# Patient Record
Sex: MALE | Race: BLACK OR AFRICAN AMERICAN | NOT HISPANIC OR LATINO
[De-identification: names, ages, dates, MRNs, and addresses within clinical notes are randomized per-mention and may not be internally consistent; named-entity substitution may affect disease eponyms.]

---

## 2018-09-14 PROBLEM — Z00.00 ENCOUNTER FOR PREVENTIVE HEALTH EXAMINATION: Status: ACTIVE | Noted: 2018-09-14

## 2019-12-10 ENCOUNTER — APPOINTMENT (OUTPATIENT)
Dept: ORTHOPEDIC SURGERY | Facility: CLINIC | Age: 39
End: 2019-12-10

## 2019-12-12 ENCOUNTER — APPOINTMENT (OUTPATIENT)
Dept: ORTHOPEDIC SURGERY | Facility: CLINIC | Age: 39
End: 2019-12-12
Payer: COMMERCIAL

## 2019-12-12 VITALS
BODY MASS INDEX: 37.94 KG/M2 | DIASTOLIC BLOOD PRESSURE: 86 MMHG | HEIGHT: 70 IN | SYSTOLIC BLOOD PRESSURE: 142 MMHG | HEART RATE: 76 BPM | WEIGHT: 265 LBS

## 2019-12-12 PROCEDURE — 99204 OFFICE O/P NEW MOD 45 MIN: CPT

## 2019-12-12 PROCEDURE — 72100 X-RAY EXAM L-S SPINE 2/3 VWS: CPT

## 2019-12-12 RX ORDER — CYCLOBENZAPRINE HYDROCHLORIDE 5 MG/1
5 TABLET, FILM COATED ORAL
Qty: 28 | Refills: 0 | Status: ACTIVE | COMMUNITY
Start: 2019-12-12 | End: 1900-01-01

## 2019-12-12 RX ORDER — MELOXICAM 7.5 MG/1
7.5 TABLET ORAL
Qty: 14 | Refills: 0 | Status: ACTIVE | COMMUNITY
Start: 2019-12-12 | End: 1900-01-01

## 2019-12-12 NOTE — DISCUSSION/SUMMARY
[de-identified] : Nate and I discussed his pain.  There are no concerning neurologic deficits or red flag signs.  He does not describe any radicular symptoms.  He does have large soft tissue masses in his lumbar area that are painful.  I have ordered an MRI to further evaluate these masses.  I have also prescribed meloxicam and cyclobenzaprine for pain.  Follow up after imaging.\par \par Any Byrd MD, EdM\par Sports Medicine PM&R\par \par

## 2019-12-12 NOTE — HISTORY OF PRESENT ILLNESS
[de-identified] : Nate is a 38M  who presents with low back pain.  He has had intermittent pain for many years.  In the past he has used muscle relaxants, which were helpful.  He reports feeling "lumps" in his low back and buttock that are painful and seem to get larger at times.   Pain  6/10 in intensity, described as throbbing in nature.  Better with rest, worse with bending and lifting.\par  Denies bowel/bladder changes, fevers, chills, saddle anesthesia.  Denies numbness, tingling, weakness of the lower extremities.\par

## 2019-12-12 NOTE — PHYSICAL EXAM
[de-identified] : \par The following radiographs were ordered and read by me during this patient's visit. I reviewed each radiograph in detail with the patient and discussed the findings as highlighted below. \par \par 2 views of the lumbar spine were obtained today that show no fracture, or dislocation. There are no degenerative changes seen. There is no malalignment. No obvious osseous abnormality. Otherwise unremarkable.\par  [de-identified] : EXAM: \par Gen: in no acute distress, seated comfortably, moving easily\par Skin: No discoloration, rashes; on palpation skin is dry, \par Neuro: Normal sensation all dermatomes, motor all myotomes\par Vascular: Normal pulses, no edema, normal temperature\par Coordination and balance: Normal\par Psych: normal mood and affect, non pressured speech, alert and oriented x3\par Musculoskeletal:\par Inspection reveals no scoliosis\par Range of motion testing demonstrates pain with flexion, full ROM\par Facet loading maneuver negative\par + tenderness to palpation of lumbar paraspinal muscles. there are mobile, soft, masses palpated in the soft tissue in the paraspinal area.  they are mildly tender to touch\par Seated slump test negative\par Straight leg raise negative\par HIPS/PELVIS -\par Symmetric in standing and lying \par Hip maneuvers:\par  Range of motion full and painfree\par passive hip impingement sign negative\par LAURENT negative \par Log roll negative\par Sacroiliac maneuvers:no TTP of  bilateral PSIS \par AP glide negative\par Jayjay's negative\par Resisted active straight leg raise negative\par no TTP of the  buttock, greater trochanters, IT band \par NEURO - Normal bulk and tone \par LE strength 5/5 including hip flexion, knee flexion, knee extension, ankle dorsiflexion, ankle plantarflexion, eversion, and EHL \par Toe-walking and heel-walking intact\par Sensation - intact to light touch in bilateral lower extremities. \par LE Reflexes 2+ patellar and Achilles reflexes bilaterally \par no Clonus\par Coordination was age appropriate and intact in all 4 limbs. \par GAIT - Normal base, normal stride length, non-antalgic\par

## 2020-01-09 ENCOUNTER — APPOINTMENT (OUTPATIENT)
Dept: ORTHOPEDIC SURGERY | Facility: CLINIC | Age: 40
End: 2020-01-09
Payer: COMMERCIAL

## 2020-01-09 DIAGNOSIS — M54.9 DORSALGIA, UNSPECIFIED: ICD-10-CM

## 2020-01-09 DIAGNOSIS — G89.29 DORSALGIA, UNSPECIFIED: ICD-10-CM

## 2020-01-09 PROCEDURE — 99213 OFFICE O/P EST LOW 20 MIN: CPT

## 2020-01-09 NOTE — HISTORY OF PRESENT ILLNESS
[de-identified] : Nate is a 39M  who presents for follow up of back pain.  Overall, his pain has improved.  He continues to have pain with heavy lifting and bending.  The masses in his lower back are not painful, but are still present.  Denies bowel/bladder changes, fevers, chills, saddle anesthesia.  Denies numbness, tingling, weakness of the lower extremities.\par

## 2020-01-09 NOTE — PHYSICAL EXAM
[de-identified] : EXAM: \par Gen: in no acute distress, seated comfortably, moving easily\par Skin: No discoloration, rashes; on palpation skin is dry, \par Neuro: Normal sensation all dermatomes, motor all myotomes\par Vascular: Normal pulses, no edema, normal temperature\par Coordination and balance: Normal\par Psych: normal mood and affect, non pressured speech, alert and oriented x3\par  [de-identified] : Patient comes to today's visit with outside imaging already performed.  I reviewed the images in detail with the patient and discussed the findings as highlighted below. \par \par MRI lumbar spine:\par "A mild disc bulge and small left paracentral disc protrusion at L3-4 mildly indents the ventral thecal sac.  There is a broad based right foraminal L4-5 disc herniation, which impinges upon the exiting right L4 nerve root, narrowing both neural foramen.  Lower lumbar facet arthrosis is noted.\par No focal subcutanous mass is appreciated.  THere no abnormal focus of subcutaenous enhancement.\par No focal masses seen in the region of palpable concern.  These may correspond to nonencapsulated lipomas.  If the mass is increasing in size or painful, consider excision and histologic analysis to exclude possibliity of atypical lipoma"

## 2020-01-09 NOTE — DISCUSSION/SUMMARY
[de-identified] : Nate and I discussed his MRI.  He has disc bulges at two levels which are the likely cause of his pain.  His symptoms are improving.  I have referred him to PT to continue strengthen his core and back musculature.  I have also offered a referral to general surgery for the lipoma biopsy vs removal.  If the masses become larger or more painful, can order contrast imaging.  Continue NSAIDs and back brace prn at work.  Can consider epidural if pain worsens.\par Follow up in 6 weeks.\par Any Byrd MD, EdM\par Sports Medicine PM&R\par

## 2021-05-17 ENCOUNTER — APPOINTMENT (OUTPATIENT)
Dept: DISASTER EMERGENCY | Facility: OTHER | Age: 41
End: 2021-05-17
Payer: COMMERCIAL

## 2021-05-17 PROCEDURE — 0012A: CPT
